# Patient Record
Sex: MALE | Race: WHITE | NOT HISPANIC OR LATINO
[De-identification: names, ages, dates, MRNs, and addresses within clinical notes are randomized per-mention and may not be internally consistent; named-entity substitution may affect disease eponyms.]

---

## 2022-12-02 VITALS — WEIGHT: 250 LBS | BODY MASS INDEX: 28.93 KG/M2 | HEIGHT: 78 IN

## 2023-01-12 ENCOUNTER — NON-APPOINTMENT (OUTPATIENT)
Age: 73
End: 2023-01-12

## 2023-01-12 DIAGNOSIS — Z86.69 PERSONAL HISTORY OF OTHER DISEASES OF THE NERVOUS SYSTEM AND SENSE ORGANS: ICD-10-CM

## 2023-01-12 DIAGNOSIS — Z86.718 PERSONAL HISTORY OF OTHER VENOUS THROMBOSIS AND EMBOLISM: ICD-10-CM

## 2023-01-12 DIAGNOSIS — Z79.891 LONG TERM (CURRENT) USE OF OPIATE ANALGESIC: ICD-10-CM

## 2023-01-12 DIAGNOSIS — Z87.39 PERSONAL HISTORY OF OTHER DISEASES OF THE MUSCULOSKELETAL SYSTEM AND CONNECTIVE TISSUE: ICD-10-CM

## 2023-01-12 DIAGNOSIS — Z86.39 PERSONAL HISTORY OF OTHER ENDOCRINE, NUTRITIONAL AND METABOLIC DISEASE: ICD-10-CM

## 2023-01-12 DIAGNOSIS — G89.4 CHRONIC PAIN SYNDROME: ICD-10-CM

## 2023-01-12 DIAGNOSIS — E11.9 TYPE 2 DIABETES MELLITUS W/OUT COMPLICATIONS: ICD-10-CM

## 2023-01-12 DIAGNOSIS — G89.29 OTHER CHRONIC PAIN: ICD-10-CM

## 2023-01-12 DIAGNOSIS — Z78.9 OTHER SPECIFIED HEALTH STATUS: ICD-10-CM

## 2023-01-12 DIAGNOSIS — Z86.79 PERSONAL HISTORY OF OTHER DISEASES OF THE CIRCULATORY SYSTEM: ICD-10-CM

## 2023-01-12 PROBLEM — Z00.00 ENCOUNTER FOR PREVENTIVE HEALTH EXAMINATION: Status: ACTIVE | Noted: 2023-01-12

## 2023-01-12 RX ORDER — AMOXICILLIN 500 MG/1
500 TABLET, FILM COATED ORAL
Refills: 0 | Status: ACTIVE | COMMUNITY

## 2023-01-12 RX ORDER — DAPAGLIFLOZIN 10 MG/1
10 TABLET, FILM COATED ORAL DAILY
Refills: 0 | Status: ACTIVE | COMMUNITY

## 2023-01-12 RX ORDER — LEVOTHYROXINE SODIUM 0.07 MG/1
75 TABLET ORAL
Refills: 0 | Status: ACTIVE | COMMUNITY

## 2023-01-12 RX ORDER — METFORMIN ER 500 MG 500 MG/1
500 TABLET ORAL DAILY
Refills: 0 | Status: ACTIVE | COMMUNITY

## 2023-02-13 ENCOUNTER — NON-APPOINTMENT (OUTPATIENT)
Age: 73
End: 2023-02-13

## 2023-02-13 DIAGNOSIS — Z86.39 PERSONAL HISTORY OF OTHER ENDOCRINE, NUTRITIONAL AND METABOLIC DISEASE: ICD-10-CM

## 2023-03-31 ENCOUNTER — APPOINTMENT (OUTPATIENT)
Dept: PAIN MANAGEMENT | Facility: CLINIC | Age: 73
End: 2023-03-31
Payer: OTHER MISCELLANEOUS

## 2023-03-31 VITALS — WEIGHT: 250 LBS | HEIGHT: 78 IN | BODY MASS INDEX: 28.93 KG/M2

## 2023-03-31 PROCEDURE — 99213 OFFICE O/P EST LOW 20 MIN: CPT

## 2023-03-31 NOTE — ASSESSMENT
[FreeTextEntry1] : Interim history\par The patient returns from 3 months down in [place].  The patient is tolerating their medications without problems. There has no new pains, injuries, or complaints and no new issues. The use of medications appears appropriate and there are no aberrant behaviors noted. Side effects to current medications are denied. Average pain score for the month is 4 out of ten. The patient's current medications are documented to the best of their ability. THe  was obtained and reviewed prior to the visit, and any discrepancies were discussed with the patient.\par Objective information\par Since the last visit there are no additional radiologic studies, labs, or pain complaints. There are no changes in the patient's physical status.\par Plan\par The patient was given refill of their medication at their current level and will return to the office as needed for follow-up. The patient is showing no aberrant behavior or evidence of diversion. Opioid contract and opioid risk assessment on chart.  reviewed and any discrepancy discussed with patent. Applicable urine toxicology reviewed and recorded in the patient's electronic record. Urine toxicology is ordered for patient per office protocol or patient's risk assessment.  Patient will follow-up in 1 month unless new issues arise the patient has returned earlier.\par

## 2023-03-31 NOTE — REASON FOR VISIT
[FreeTextEntry2] : RK CASE DOA: 1986 PT IS BEING SEEN FOR A FOLLOW-UP VIRTUALLY PAIN MANAGEMENT VISIT FOR MED REFILL \par

## 2023-03-31 NOTE — HISTORY OF PRESENT ILLNESS
[Lower back] : lower back [6] : 6 [Stabbing] : stabbing [Constant] : constant [Household chores] : household chores [Rest] : rest [Bending forward] : bending forward [de-identified] : 03/31/2023- PATIENT STATES HAS DONE PHYSICAL THERAPY IN THE PAST UNKWN DATE AS PER PT  1X A WEEK  AND REPORT'S WORSEN  IMPROVEMENT WITH PAIN.\par PT STATES CURRENTLY DO HOME STRETCHING PROGRAM AT HOME 4X A WEEK AT LEAST  AND REPORT'S MILD IMPROVEMENT WITH PAIN.  \par  [] : Post Surgical Visit: no [de-identified] : MRI LS 08/24/2016

## 2023-05-08 ENCOUNTER — APPOINTMENT (OUTPATIENT)
Dept: PAIN MANAGEMENT | Facility: CLINIC | Age: 73
End: 2023-05-08
Payer: OTHER MISCELLANEOUS

## 2023-05-08 VITALS — HEIGHT: 78 IN | BODY MASS INDEX: 32.4 KG/M2 | WEIGHT: 280 LBS

## 2023-05-08 PROCEDURE — 99213 OFFICE O/P EST LOW 20 MIN: CPT

## 2023-05-08 NOTE — HISTORY OF PRESENT ILLNESS
[Lower back] : lower back [Work related] : work related [Stabbing] : stabbing [Constant] : constant [Household chores] : household chores [Rest] : rest [Bending forward] : bending forward [8] : 8 [6] : 6 [Burning] : burning [] : Post Surgical Visit: no [FreeTextEntry3] : 05/22/1998 [FreeTextEntry5] : ARRESTING DEFENT LIFTED HEAVY OBECJT  [FreeTextEntry7] : RT SIDE GREATER THAN LT  [de-identified] : MRI LS 08/24/2016 [de-identified] : 03/31/2023- PATIENT STATES HAS DONE PHYSICAL THERAPY IN THE PAST UNKWN DATE AS PER PT  1X A WEEK  AND REPORT'S WORSEN  IMPROVEMENT WITH PAIN.\par PT STATES CURRENTLY DO HOME STRETCHING PROGRAM AT HOME 4X A WEEK AT LEAST  AND REPORT'S MILD IMPROVEMENT WITH PAIN.  \par

## 2023-06-06 ENCOUNTER — APPOINTMENT (OUTPATIENT)
Dept: PAIN MANAGEMENT | Facility: CLINIC | Age: 73
End: 2023-06-06

## 2023-06-13 ENCOUNTER — APPOINTMENT (OUTPATIENT)
Dept: PAIN MANAGEMENT | Facility: CLINIC | Age: 73
End: 2023-06-13
Payer: OTHER MISCELLANEOUS

## 2023-06-13 VITALS — WEIGHT: 280 LBS | HEIGHT: 78 IN | BODY MASS INDEX: 32.4 KG/M2

## 2023-06-13 PROCEDURE — 99213 OFFICE O/P EST LOW 20 MIN: CPT | Mod: ACP

## 2023-06-13 RX ORDER — ROSUVASTATIN CALCIUM 10 MG/1
10 TABLET, FILM COATED ORAL DAILY
Refills: 0 | Status: ACTIVE | COMMUNITY

## 2023-06-13 NOTE — DISCUSSION/SUMMARY
[Medication Risks Reviewed] : Medication risks reviewed [de-identified] : Prescriptions renewed. [place]obtained [place]  reviewed and appropriate. SOAPP-R completed on chart. The patient's medications are documented to the best of their ability. Quality of life and functional ability improved on medications. The patient is showing no aberrant behavior or evidence of diversion. The patient was advised not to use narcotic medication while operating an automobile or heavy machinery due to potential sedation or dizziness. The patient was educated to the risks associated with potential opioid dependence and addiction. Urine toxicology screens as per office protocol. Use of multimodal analgesia used prn.\par Follow up one month.

## 2023-06-13 NOTE — HISTORY OF PRESENT ILLNESS
[Mid-back] : mid-back [Lower back] : lower back [Work related] : work related [Gradual] : gradual [5] : 5 [Stabbing] : stabbing [Intermittent] : intermittent [Household chores] : household chores [Work] : work [Sleep] : sleep [Rest] : rest [Meds] : meds [Heat] : heat [Sitting] : sitting [Standing] : standing [Walking] : walking [Bending forward] : bending forward [Retired] : Work status: retired [FreeTextEntry1] : Chronic back pain. Manages well on current medication regimen. ADL improved. Last UDT was 3- and consistent with prescribed medication.  [] : Post Surgical Visit: no [FreeTextEntry6] : LOWER RT SIDE  [de-identified] : PROLONGED ACTIVITY  [de-identified] : PT PERFORMS HOME EXERCISES 3-4X WEEK , DAILY ACTIVITIES  [de-identified] :

## 2023-07-19 ENCOUNTER — APPOINTMENT (OUTPATIENT)
Dept: PAIN MANAGEMENT | Facility: CLINIC | Age: 73
End: 2023-07-19

## 2023-07-21 ENCOUNTER — APPOINTMENT (OUTPATIENT)
Dept: PAIN MANAGEMENT | Facility: CLINIC | Age: 73
End: 2023-07-21
Payer: OTHER MISCELLANEOUS

## 2023-07-21 PROCEDURE — 99213 OFFICE O/P EST LOW 20 MIN: CPT

## 2023-07-21 NOTE — HISTORY OF PRESENT ILLNESS
[Mid-back] : mid-back [Lower back] : lower back [Work related] : work related [Gradual] : gradual [5] : 5 [Stabbing] : stabbing [Intermittent] : intermittent [Household chores] : household chores [Work] : work [Sleep] : sleep [Rest] : rest [Meds] : meds [Heat] : heat [Sitting] : sitting [Standing] : standing [Walking] : walking [Bending forward] : bending forward [Retired] : Work status: retired [] : Post Surgical Visit: no [FreeTextEntry6] : LOWER RT SIDE  [de-identified] : PROLONGED ACTIVITY  [de-identified] : PT PERFORMS HOME EXERCISES 3-4X WEEK , DAILY ACTIVITIES  [de-identified] :

## 2023-09-08 ENCOUNTER — APPOINTMENT (OUTPATIENT)
Dept: PAIN MANAGEMENT | Facility: CLINIC | Age: 73
End: 2023-09-08
Payer: OTHER MISCELLANEOUS

## 2023-09-08 PROCEDURE — 99213 OFFICE O/P EST LOW 20 MIN: CPT

## 2023-09-08 NOTE — HISTORY OF PRESENT ILLNESS
[Mid-back] : mid-back [Lower back] : lower back [Work related] : work related [Gradual] : gradual [5] : 5 [Stabbing] : stabbing [Intermittent] : intermittent [Household chores] : household chores [Work] : work [Sleep] : sleep [Rest] : rest [Meds] : meds [Heat] : heat [Sitting] : sitting [Standing] : standing [Walking] : walking [Bending forward] : bending forward [Retired] : Work status: retired [] : Post Surgical Visit: no [FreeTextEntry6] : LOWER RT SIDE  [de-identified] : PROLONGED ACTIVITY  [de-identified] : PT PERFORMS HOME EXERCISES 3-4X WEEK , DAILY ACTIVITIES  [de-identified] :

## 2023-10-13 ENCOUNTER — APPOINTMENT (OUTPATIENT)
Dept: PAIN MANAGEMENT | Facility: CLINIC | Age: 73
End: 2023-10-13
Payer: OTHER MISCELLANEOUS

## 2023-10-13 PROCEDURE — 99213 OFFICE O/P EST LOW 20 MIN: CPT

## 2023-11-20 ENCOUNTER — APPOINTMENT (OUTPATIENT)
Dept: PAIN MANAGEMENT | Facility: CLINIC | Age: 73
End: 2023-11-20
Payer: OTHER MISCELLANEOUS

## 2023-11-20 VITALS — HEIGHT: 78 IN | WEIGHT: 280 LBS | BODY MASS INDEX: 32.4 KG/M2

## 2023-11-20 PROCEDURE — 99213 OFFICE O/P EST LOW 20 MIN: CPT

## 2023-12-18 ENCOUNTER — APPOINTMENT (OUTPATIENT)
Dept: PAIN MANAGEMENT | Facility: CLINIC | Age: 73
End: 2023-12-18

## 2024-03-15 ENCOUNTER — APPOINTMENT (OUTPATIENT)
Dept: PAIN MANAGEMENT | Facility: CLINIC | Age: 74
End: 2024-03-15
Payer: OTHER MISCELLANEOUS

## 2024-03-15 PROCEDURE — 99213 OFFICE O/P EST LOW 20 MIN: CPT

## 2024-03-15 RX ORDER — TIZANIDINE 4 MG/1
4 TABLET ORAL 3 TIMES DAILY
Qty: 90 | Refills: 5 | Status: ACTIVE | COMMUNITY
Start: 1900-01-01 | End: 1900-01-01

## 2024-03-15 NOTE — HISTORY OF PRESENT ILLNESS
[Mid-back] : mid-back [Lower back] : lower back [Work related] : work related [Gradual] : gradual [5] : 5 [Stabbing] : stabbing [Intermittent] : intermittent [Household chores] : household chores [Work] : work [Sleep] : sleep [Rest] : rest [Meds] : meds [Heat] : heat [Sitting] : sitting [Standing] : standing [Walking] : walking [Bending forward] : bending forward [Retired] : Work status: retired [FreeTextEntry1] : XOCHITL HOLLIS IS FOLLOWING UP FOR PAIN MED REFILL, THERE HAS NOT BEEN CHANGES IN PAIN SINCE LAST VISIT.   LAST UDS:03/15/2024 [FreeTextEntry3] : 05/22/1998 [] : Post Surgical Visit: no [FreeTextEntry5] : moving heavy object while making an arrest  [FreeTextEntry6] : LOWER RT SIDE  [de-identified] : PT PERFORMS HOME EXERCISES 3-4X WEEK , DAILY ACTIVITIES  [de-identified] : PROLONGED ACTIVITY  [de-identified] :

## 2024-04-19 ENCOUNTER — APPOINTMENT (OUTPATIENT)
Dept: PAIN MANAGEMENT | Facility: CLINIC | Age: 74
End: 2024-04-19
Payer: OTHER MISCELLANEOUS

## 2024-04-19 PROCEDURE — 99213 OFFICE O/P EST LOW 20 MIN: CPT

## 2024-05-17 ENCOUNTER — APPOINTMENT (OUTPATIENT)
Dept: PAIN MANAGEMENT | Facility: CLINIC | Age: 74
End: 2024-05-17
Payer: OTHER MISCELLANEOUS

## 2024-05-17 PROCEDURE — 99213 OFFICE O/P EST LOW 20 MIN: CPT

## 2024-05-17 NOTE — HISTORY OF PRESENT ILLNESS
[Mid-back] : mid-back [Lower back] : lower back [Work related] : work related [Gradual] : gradual [5] : 5 [Stabbing] : stabbing [Intermittent] : intermittent [Household chores] : household chores [Work] : work [Sleep] : sleep [Rest] : rest [Meds] : meds [Heat] : heat [Sitting] : sitting [Standing] : standing [Walking] : walking [Bending forward] : bending forward [Retired] : Work status: retired [FreeTextEntry1] : XOCHITL HOLLIS IS FOLLOWING UP FOR PAIN MED REFILL, THERE HAS NOT BEEN CHANGES IN PAIN SINCE LAST VISIT.   LAST UDS:03/15/2024 [] : Post Surgical Visit: no [FreeTextEntry3] : 05/22/1998 [FreeTextEntry5] : moving heavy object while making an arrest  [FreeTextEntry6] : LOWER RT SIDE  [de-identified] : PROLONGED ACTIVITY  [de-identified] : PT PERFORMS HOME EXERCISES 3-4X WEEK , DAILY ACTIVITIES  [de-identified] :

## 2024-06-18 ENCOUNTER — APPOINTMENT (OUTPATIENT)
Dept: PAIN MANAGEMENT | Facility: CLINIC | Age: 74
End: 2024-06-18
Payer: OTHER MISCELLANEOUS

## 2024-06-18 DIAGNOSIS — M54.16 RADICULOPATHY, LUMBAR REGION: ICD-10-CM

## 2024-06-18 PROCEDURE — 99213 OFFICE O/P EST LOW 20 MIN: CPT | Mod: ACP

## 2024-06-18 RX ORDER — OXYCODONE HYDROCHLORIDE 15 MG/1
15 TABLET ORAL
Qty: 60 | Refills: 0 | Status: ACTIVE | COMMUNITY
Start: 1900-01-01 | End: 1900-01-01

## 2024-06-18 RX ORDER — CELECOXIB 200 MG/1
200 CAPSULE ORAL DAILY
Qty: 30 | Refills: 5 | Status: ACTIVE | COMMUNITY
Start: 1900-01-01 | End: 1900-01-01

## 2024-06-18 NOTE — DISCUSSION/SUMMARY
[Medication Risks Reviewed] : Medication risks reviewed [de-identified] : Prescriptions renewed. [place] and appropriate. SOAPP-R completed on chart. The patient's medications are documented to the best of their ability. Quality of life and functional ability improved on medications. The patient is showing no aberrant behavior or evidence of diversion. The patient was advised not to use narcotic medication while operating an automobile or heavy machinery due to potential sedation or dizziness. The patient was educated to the risks associated with potential opioid dependence and addiction. Urine toxicology screens as per office protocol. Use of multimodal analgesia used prn. Follow up one month.

## 2024-06-18 NOTE — HISTORY OF PRESENT ILLNESS
[Mid-back] : mid-back [Lower back] : lower back [Work related] : work related [Gradual] : gradual [10] : 10 [6] : 6 [Stabbing] : stabbing [Constant] : constant [Household chores] : household chores [Work] : work [Sleep] : sleep [Rest] : rest [Meds] : meds [Heat] : heat [Sitting] : sitting [Standing] : standing [Walking] : walking [Bending forward] : bending forward [Retired] : Work status: retired [FreeTextEntry1] : Low back pain stable.  Maintains a functional ADL. Meds effective prn. Last UDT was 3-   and consistent with prescribed medication. [] : Post Surgical Visit: no [FreeTextEntry3] : 05/22/1998 [FreeTextEntry5] : moving heavy object while making an arrest  [FreeTextEntry6] : LOWER RT SIDE  [de-identified] : PROLONGED ACTIVITY  [de-identified] : PT PERFORMS HOME EXERCISES 3-4X WEEK , DAILY ACTIVITIES  [de-identified] :

## 2024-07-23 ENCOUNTER — APPOINTMENT (OUTPATIENT)
Dept: PAIN MANAGEMENT | Facility: CLINIC | Age: 74
End: 2024-07-23
Payer: OTHER MISCELLANEOUS

## 2024-07-23 VITALS — HEIGHT: 78 IN | BODY MASS INDEX: 31.82 KG/M2 | WEIGHT: 275 LBS

## 2024-07-23 DIAGNOSIS — M54.16 RADICULOPATHY, LUMBAR REGION: ICD-10-CM

## 2024-07-23 PROCEDURE — 99213 OFFICE O/P EST LOW 20 MIN: CPT | Mod: ACP

## 2024-07-23 NOTE — DISCUSSION/SUMMARY
[Medication Risks Reviewed] : Medication risks reviewed [de-identified] : Prescriptions renewed. [place] and appropriate. SOAPP-R completed on chart. The patient's medications are documented to the best of their ability. Quality of life and functional ability improved on medications. The patient is showing no aberrant behavior or evidence of diversion. The patient was advised not to use narcotic medication while operating an automobile or heavy machinery due to potential sedation or dizziness. The patient was educated to the risks associated with potential opioid dependence and addiction. Urine toxicology screens as per office protocol. Use of multimodal analgesia used prn. Follow up one month.

## 2024-07-23 NOTE — HISTORY OF PRESENT ILLNESS
[Mid-back] : mid-back [Lower back] : lower back [Work related] : work related [Gradual] : gradual [5] : 5 [3] : 3 [Stabbing] : stabbing [Constant] : constant [Household chores] : household chores [Work] : work [Sleep] : sleep [Rest] : rest [Meds] : meds [Heat] : heat [Sitting] : sitting [Standing] : standing [Walking] : walking [Bending forward] : bending forward [Retired] : Work status: retired [FreeTextEntry1] : Back pain stable. Meds effective prn.  Maintains a functional ADL. Last UDT was 6- and consistent with prescribed medication. [] : Post Surgical Visit: no [FreeTextEntry3] : 05/22/1998 [FreeTextEntry5] : moving heavy object while making an arrest  [FreeTextEntry6] : LOWER RT SIDE  [de-identified] : PROLONGED ACTIVITY  [de-identified] : PT PERFORMS HOME EXERCISES 3-4X WEEK , DAILY ACTIVITIES  [de-identified] :

## 2024-09-03 ENCOUNTER — APPOINTMENT (OUTPATIENT)
Dept: PAIN MANAGEMENT | Facility: CLINIC | Age: 74
End: 2024-09-03
Payer: OTHER MISCELLANEOUS

## 2024-09-03 VITALS — HEIGHT: 78 IN | BODY MASS INDEX: 32.4 KG/M2 | WEIGHT: 280 LBS

## 2024-09-03 DIAGNOSIS — M54.16 RADICULOPATHY, LUMBAR REGION: ICD-10-CM

## 2024-09-03 PROCEDURE — 99213 OFFICE O/P EST LOW 20 MIN: CPT | Mod: ACP

## 2024-09-03 NOTE — HISTORY OF PRESENT ILLNESS
[Mid-back] : mid-back [Lower back] : lower back [Work related] : work related [Gradual] : gradual [7] : 7 [5] : 5 [Stabbing] : stabbing [Constant] : constant [Household chores] : household chores [Work] : work [Sleep] : sleep [Rest] : rest [Meds] : meds [Heat] : heat [Sitting] : sitting [Standing] : standing [Walking] : walking [Bending forward] : bending forward [Retired] : Work status: retired [FreeTextEntry1] : States chronic back pain stable. Pain meds effective prn.  Maintains a functional ADL. Last UDT was 6- and consistent with prescribed medication. [] : Post Surgical Visit: no [FreeTextEntry3] : 05/22/1998 [FreeTextEntry5] : moving heavy object while making an arrest  [FreeTextEntry6] : LOWER RT SIDE  [de-identified] : PROLONGED ACTIVITY  [de-identified] : PT PERFORMS HOME EXERCISES 3-4X WEEK , DAILY ACTIVITIES  [de-identified] :

## 2024-09-03 NOTE — DISCUSSION/SUMMARY
[Medication Risks Reviewed] : Medication risks reviewed [de-identified] : Prescriptions renewed. [place] and appropriate. SOAPP-R completed on chart. The patient's medications are documented to the best of their ability. Quality of life and functional ability improved on medications. The patient is showing no aberrant behavior or evidence of diversion. The patient was advised not to use narcotic medication while operating an automobile or heavy machinery due to potential sedation or dizziness. The patient was educated to the risks associated with potential opioid dependence and addiction. Urine toxicology screens as per office protocol. Use of multimodal analgesia used prn. Follow up one month.

## 2024-10-08 ENCOUNTER — APPOINTMENT (OUTPATIENT)
Dept: PAIN MANAGEMENT | Facility: CLINIC | Age: 74
End: 2024-10-08
Payer: OTHER MISCELLANEOUS

## 2024-10-08 VITALS — WEIGHT: 280 LBS | HEIGHT: 78 IN | BODY MASS INDEX: 32.4 KG/M2

## 2024-10-08 DIAGNOSIS — M54.16 RADICULOPATHY, LUMBAR REGION: ICD-10-CM

## 2024-10-08 PROCEDURE — 99213 OFFICE O/P EST LOW 20 MIN: CPT | Mod: ACP

## 2024-11-19 ENCOUNTER — APPOINTMENT (OUTPATIENT)
Dept: PAIN MANAGEMENT | Facility: CLINIC | Age: 74
End: 2024-11-19
Payer: OTHER MISCELLANEOUS

## 2024-11-19 DIAGNOSIS — M54.16 RADICULOPATHY, LUMBAR REGION: ICD-10-CM

## 2024-11-19 PROCEDURE — 99213 OFFICE O/P EST LOW 20 MIN: CPT | Mod: ACP

## 2024-12-17 ENCOUNTER — APPOINTMENT (OUTPATIENT)
Dept: PAIN MANAGEMENT | Facility: CLINIC | Age: 74
End: 2024-12-17
Payer: OTHER MISCELLANEOUS

## 2024-12-17 VITALS — WEIGHT: 280 LBS | BODY MASS INDEX: 32.4 KG/M2 | HEIGHT: 78 IN

## 2024-12-17 DIAGNOSIS — M54.16 RADICULOPATHY, LUMBAR REGION: ICD-10-CM

## 2024-12-17 PROCEDURE — 99212 OFFICE O/P EST SF 10 MIN: CPT | Mod: ACP

## 2025-03-25 ENCOUNTER — APPOINTMENT (OUTPATIENT)
Dept: PAIN MANAGEMENT | Facility: CLINIC | Age: 75
End: 2025-03-25
Payer: OTHER MISCELLANEOUS

## 2025-03-25 VITALS — WEIGHT: 280 LBS | HEIGHT: 78 IN | BODY MASS INDEX: 32.4 KG/M2

## 2025-03-25 DIAGNOSIS — M54.16 RADICULOPATHY, LUMBAR REGION: ICD-10-CM

## 2025-03-25 PROCEDURE — 99213 OFFICE O/P EST LOW 20 MIN: CPT | Mod: ACP

## 2025-04-22 ENCOUNTER — APPOINTMENT (OUTPATIENT)
Dept: PAIN MANAGEMENT | Facility: CLINIC | Age: 75
End: 2025-04-22
Payer: OTHER MISCELLANEOUS

## 2025-04-22 VITALS — BODY MASS INDEX: 32.4 KG/M2 | HEIGHT: 78 IN | WEIGHT: 280 LBS

## 2025-04-22 DIAGNOSIS — M54.16 RADICULOPATHY, LUMBAR REGION: ICD-10-CM

## 2025-04-22 PROCEDURE — 99213 OFFICE O/P EST LOW 20 MIN: CPT | Mod: ACP

## 2025-06-17 ENCOUNTER — APPOINTMENT (OUTPATIENT)
Dept: PAIN MANAGEMENT | Facility: CLINIC | Age: 75
End: 2025-06-17
Payer: OTHER MISCELLANEOUS

## 2025-06-17 VITALS — BODY MASS INDEX: 32.4 KG/M2 | WEIGHT: 280 LBS | HEIGHT: 78 IN

## 2025-06-17 PROCEDURE — 99212 OFFICE O/P EST SF 10 MIN: CPT | Mod: ACP

## 2025-07-29 ENCOUNTER — APPOINTMENT (OUTPATIENT)
Dept: PAIN MANAGEMENT | Facility: CLINIC | Age: 75
End: 2025-07-29
Payer: OTHER MISCELLANEOUS

## 2025-07-29 DIAGNOSIS — M54.16 RADICULOPATHY, LUMBAR REGION: ICD-10-CM

## 2025-07-29 PROCEDURE — 99212 OFFICE O/P EST SF 10 MIN: CPT | Mod: ACP

## 2025-08-26 ENCOUNTER — APPOINTMENT (OUTPATIENT)
Dept: PAIN MANAGEMENT | Facility: CLINIC | Age: 75
End: 2025-08-26
Payer: OTHER MISCELLANEOUS

## 2025-08-26 VITALS — WEIGHT: 280 LBS | HEIGHT: 78 IN | BODY MASS INDEX: 32.4 KG/M2

## 2025-08-26 DIAGNOSIS — M54.16 RADICULOPATHY, LUMBAR REGION: ICD-10-CM

## 2025-08-26 PROCEDURE — 99212 OFFICE O/P EST SF 10 MIN: CPT | Mod: ACP
